# Patient Record
Sex: MALE | Race: WHITE | NOT HISPANIC OR LATINO | Employment: FULL TIME | URBAN - METROPOLITAN AREA
[De-identification: names, ages, dates, MRNs, and addresses within clinical notes are randomized per-mention and may not be internally consistent; named-entity substitution may affect disease eponyms.]

---

## 2022-10-09 ENCOUNTER — HOSPITAL ENCOUNTER (OUTPATIENT)
Facility: MEDICAL CENTER | Age: 22
End: 2022-10-10
Attending: EMERGENCY MEDICINE | Admitting: HOSPITALIST
Payer: COMMERCIAL

## 2022-10-09 ENCOUNTER — APPOINTMENT (OUTPATIENT)
Dept: RADIOLOGY | Facility: MEDICAL CENTER | Age: 22
End: 2022-10-09
Attending: EMERGENCY MEDICINE
Payer: COMMERCIAL

## 2022-10-09 DIAGNOSIS — R00.1 BRADYCARDIA: ICD-10-CM

## 2022-10-09 DIAGNOSIS — I47.10 SVT (SUPRAVENTRICULAR TACHYCARDIA) (HCC): ICD-10-CM

## 2022-10-09 PROBLEM — I10 PRIMARY HYPERTENSION: Status: ACTIVE | Noted: 2022-10-09

## 2022-10-09 PROBLEM — E78.5 DYSLIPIDEMIA: Status: ACTIVE | Noted: 2022-10-09

## 2022-10-09 PROBLEM — T50.905A BRADYCARDIA, DRUG INDUCED: Status: ACTIVE | Noted: 2022-10-09

## 2022-10-09 PROBLEM — F31.9 BIPOLAR 1 DISORDER (HCC): Status: ACTIVE | Noted: 2022-10-09

## 2022-10-09 PROBLEM — R55 SYNCOPE AND COLLAPSE: Status: ACTIVE | Noted: 2022-10-09

## 2022-10-09 PROBLEM — F17.200 NICOTINE DEPENDENCE: Status: ACTIVE | Noted: 2022-10-09

## 2022-10-09 LAB
ALBUMIN SERPL BCP-MCNC: 4.2 G/DL (ref 3.2–4.9)
ALBUMIN/GLOB SERPL: 1.4 G/DL
ALP SERPL-CCNC: 97 U/L (ref 30–99)
ALT SERPL-CCNC: 63 U/L (ref 2–50)
ANION GAP SERPL CALC-SCNC: 10 MMOL/L (ref 7–16)
AST SERPL-CCNC: 36 U/L (ref 12–45)
BASOPHILS # BLD AUTO: 0.4 % (ref 0–1.8)
BASOPHILS # BLD: 0.05 K/UL (ref 0–0.12)
BILIRUB SERPL-MCNC: 0.2 MG/DL (ref 0.1–1.5)
BUN SERPL-MCNC: 15 MG/DL (ref 8–22)
CALCIUM SERPL-MCNC: 9.7 MG/DL (ref 8.5–10.5)
CHLORIDE SERPL-SCNC: 106 MMOL/L (ref 96–112)
CO2 SERPL-SCNC: 23 MMOL/L (ref 20–33)
CREAT SERPL-MCNC: 0.84 MG/DL (ref 0.5–1.4)
EKG IMPRESSION: NORMAL
EKG IMPRESSION: NORMAL
EOSINOPHIL # BLD AUTO: 0.34 K/UL (ref 0–0.51)
EOSINOPHIL NFR BLD: 3 % (ref 0–6.9)
ERYTHROCYTE [DISTWIDTH] IN BLOOD BY AUTOMATED COUNT: 46.4 FL (ref 35.9–50)
GFR SERPLBLD CREATININE-BSD FMLA CKD-EPI: 125 ML/MIN/1.73 M 2
GLOBULIN SER CALC-MCNC: 2.9 G/DL (ref 1.9–3.5)
GLUCOSE SERPL-MCNC: 94 MG/DL (ref 65–99)
HCT VFR BLD AUTO: 43.3 % (ref 42–52)
HGB BLD-MCNC: 14.2 G/DL (ref 14–18)
IMM GRANULOCYTES # BLD AUTO: 0.06 K/UL (ref 0–0.11)
IMM GRANULOCYTES NFR BLD AUTO: 0.5 % (ref 0–0.9)
LYMPHOCYTES # BLD AUTO: 2.4 K/UL (ref 1–4.8)
LYMPHOCYTES NFR BLD: 21.1 % (ref 22–41)
MCH RBC QN AUTO: 28.9 PG (ref 27–33)
MCHC RBC AUTO-ENTMCNC: 32.8 G/DL (ref 33.7–35.3)
MCV RBC AUTO: 88 FL (ref 81.4–97.8)
MONOCYTES # BLD AUTO: 0.76 K/UL (ref 0–0.85)
MONOCYTES NFR BLD AUTO: 6.7 % (ref 0–13.4)
NEUTROPHILS # BLD AUTO: 7.79 K/UL (ref 1.82–7.42)
NEUTROPHILS NFR BLD: 68.3 % (ref 44–72)
NRBC # BLD AUTO: 0 K/UL
NRBC BLD-RTO: 0 /100 WBC
PLATELET # BLD AUTO: 292 K/UL (ref 164–446)
PMV BLD AUTO: 10.2 FL (ref 9–12.9)
POTASSIUM SERPL-SCNC: 4.2 MMOL/L (ref 3.6–5.5)
PROT SERPL-MCNC: 7.1 G/DL (ref 6–8.2)
RBC # BLD AUTO: 4.92 M/UL (ref 4.7–6.1)
SODIUM SERPL-SCNC: 139 MMOL/L (ref 135–145)
TROPONIN T SERPL-MCNC: <6 NG/L (ref 6–19)
TSH SERPL DL<=0.005 MIU/L-ACNC: 1.6 UIU/ML (ref 0.38–5.33)
WBC # BLD AUTO: 11.4 K/UL (ref 4.8–10.8)

## 2022-10-09 PROCEDURE — 84484 ASSAY OF TROPONIN QUANT: CPT

## 2022-10-09 PROCEDURE — 80053 COMPREHEN METABOLIC PANEL: CPT

## 2022-10-09 PROCEDURE — 700102 HCHG RX REV CODE 250 W/ 637 OVERRIDE(OP): Performed by: HOSPITALIST

## 2022-10-09 PROCEDURE — 99285 EMERGENCY DEPT VISIT HI MDM: CPT

## 2022-10-09 PROCEDURE — 93005 ELECTROCARDIOGRAM TRACING: CPT

## 2022-10-09 PROCEDURE — G0378 HOSPITAL OBSERVATION PER HR: HCPCS

## 2022-10-09 PROCEDURE — 84443 ASSAY THYROID STIM HORMONE: CPT

## 2022-10-09 PROCEDURE — 71045 X-RAY EXAM CHEST 1 VIEW: CPT

## 2022-10-09 PROCEDURE — 99220 PR INITIAL OBSERVATION CARE,LEVL III: CPT | Performed by: HOSPITALIST

## 2022-10-09 PROCEDURE — A9270 NON-COVERED ITEM OR SERVICE: HCPCS | Performed by: HOSPITALIST

## 2022-10-09 PROCEDURE — 93005 ELECTROCARDIOGRAM TRACING: CPT | Performed by: EMERGENCY MEDICINE

## 2022-10-09 PROCEDURE — 85025 COMPLETE CBC W/AUTO DIFF WBC: CPT

## 2022-10-09 RX ORDER — BENZTROPINE MESYLATE 0.5 MG/1
0.5 TABLET ORAL 2 TIMES DAILY
Status: DISCONTINUED | OUTPATIENT
Start: 2022-10-09 | End: 2022-10-10 | Stop reason: HOSPADM

## 2022-10-09 RX ORDER — FAMOTIDINE 20 MG/1
20 TABLET, FILM COATED ORAL DAILY
COMMUNITY

## 2022-10-09 RX ORDER — CLONAZEPAM 0.5 MG/1
0.5 TABLET ORAL NIGHTLY
Status: DISCONTINUED | OUTPATIENT
Start: 2022-10-09 | End: 2022-10-10 | Stop reason: HOSPADM

## 2022-10-09 RX ORDER — IBUPROFEN 600 MG/1
600 TABLET ORAL 3 TIMES DAILY PRN
Status: DISCONTINUED | OUTPATIENT
Start: 2022-10-09 | End: 2022-10-10 | Stop reason: HOSPADM

## 2022-10-09 RX ORDER — EZETIMIBE 10 MG/1
10 TABLET ORAL DAILY
COMMUNITY

## 2022-10-09 RX ORDER — BENZTROPINE MESYLATE 0.5 MG/1
0.5 TABLET ORAL 2 TIMES DAILY
COMMUNITY

## 2022-10-09 RX ORDER — QUETIAPINE FUMARATE 100 MG/1
100 TABLET, FILM COATED ORAL 2 TIMES DAILY
COMMUNITY

## 2022-10-09 RX ORDER — BISACODYL 10 MG
10 SUPPOSITORY, RECTAL RECTAL
Status: DISCONTINUED | OUTPATIENT
Start: 2022-10-09 | End: 2022-10-10 | Stop reason: HOSPADM

## 2022-10-09 RX ORDER — PRAZOSIN HYDROCHLORIDE 1 MG/1
2 CAPSULE ORAL NIGHTLY
COMMUNITY

## 2022-10-09 RX ORDER — HYDRALAZINE HYDROCHLORIDE 20 MG/ML
10 INJECTION INTRAMUSCULAR; INTRAVENOUS EVERY 6 HOURS PRN
Status: DISCONTINUED | OUTPATIENT
Start: 2022-10-09 | End: 2022-10-10 | Stop reason: HOSPADM

## 2022-10-09 RX ORDER — ONDANSETRON 4 MG/1
4 TABLET, ORALLY DISINTEGRATING ORAL EVERY 4 HOURS PRN
Status: DISCONTINUED | OUTPATIENT
Start: 2022-10-09 | End: 2022-10-10 | Stop reason: HOSPADM

## 2022-10-09 RX ORDER — NICOTINE 21 MG/24HR
1 PATCH, TRANSDERMAL 24 HOURS TRANSDERMAL EVERY 24 HOURS
COMMUNITY

## 2022-10-09 RX ORDER — METHOCARBAMOL 500 MG/1
1000 TABLET, FILM COATED ORAL 2 TIMES DAILY PRN
Status: DISCONTINUED | OUTPATIENT
Start: 2022-10-09 | End: 2022-10-10 | Stop reason: HOSPADM

## 2022-10-09 RX ORDER — PROPRANOLOL HYDROCHLORIDE 80 MG/1
80 CAPSULE, EXTENDED RELEASE ORAL DAILY
Status: ON HOLD | COMMUNITY
End: 2022-10-10

## 2022-10-09 RX ORDER — MIRTAZAPINE 15 MG/1
7.5 TABLET, FILM COATED ORAL NIGHTLY
Status: DISCONTINUED | OUTPATIENT
Start: 2022-10-09 | End: 2022-10-10 | Stop reason: HOSPADM

## 2022-10-09 RX ORDER — AMOXICILLIN 250 MG
2 CAPSULE ORAL 2 TIMES DAILY
Status: DISCONTINUED | OUTPATIENT
Start: 2022-10-09 | End: 2022-10-10 | Stop reason: HOSPADM

## 2022-10-09 RX ORDER — PROPRANOLOL HYDROCHLORIDE 80 MG/1
80 CAPSULE, EXTENDED RELEASE ORAL
Status: SHIPPED | COMMUNITY
End: 2022-10-09

## 2022-10-09 RX ORDER — CLONAZEPAM 0.5 MG/1
0.5 TABLET ORAL NIGHTLY
COMMUNITY

## 2022-10-09 RX ORDER — PROMETHAZINE HYDROCHLORIDE 25 MG/1
12.5-25 SUPPOSITORY RECTAL EVERY 4 HOURS PRN
Status: DISCONTINUED | OUTPATIENT
Start: 2022-10-09 | End: 2022-10-10 | Stop reason: HOSPADM

## 2022-10-09 RX ORDER — FAMOTIDINE 20 MG/1
20 TABLET, FILM COATED ORAL DAILY
Status: DISCONTINUED | OUTPATIENT
Start: 2022-10-10 | End: 2022-10-10 | Stop reason: HOSPADM

## 2022-10-09 RX ORDER — ACETAMINOPHEN 325 MG/1
650 TABLET ORAL EVERY 4 HOURS PRN
Status: DISCONTINUED | OUTPATIENT
Start: 2022-10-09 | End: 2022-10-10 | Stop reason: HOSPADM

## 2022-10-09 RX ORDER — M-VIT,TX,IRON,MINS/CALC/FOLIC 27MG-0.4MG
1 TABLET ORAL DAILY
COMMUNITY

## 2022-10-09 RX ORDER — EZETIMIBE 10 MG/1
10 TABLET ORAL DAILY
Status: DISCONTINUED | OUTPATIENT
Start: 2022-10-10 | End: 2022-10-10 | Stop reason: HOSPADM

## 2022-10-09 RX ORDER — PRAZOSIN HYDROCHLORIDE 1 MG/1
2 CAPSULE ORAL NIGHTLY
Status: DISCONTINUED | OUTPATIENT
Start: 2022-10-09 | End: 2022-10-10 | Stop reason: HOSPADM

## 2022-10-09 RX ORDER — HALOPERIDOL 5 MG/ML
1 INJECTION INTRAMUSCULAR EVERY 4 HOURS PRN
Status: DISCONTINUED | OUTPATIENT
Start: 2022-10-09 | End: 2022-10-10 | Stop reason: HOSPADM

## 2022-10-09 RX ORDER — METHOCARBAMOL 500 MG/1
1000 TABLET, FILM COATED ORAL 2 TIMES DAILY PRN
COMMUNITY

## 2022-10-09 RX ORDER — ONDANSETRON 2 MG/ML
4 INJECTION INTRAMUSCULAR; INTRAVENOUS EVERY 4 HOURS PRN
Status: DISCONTINUED | OUTPATIENT
Start: 2022-10-09 | End: 2022-10-10 | Stop reason: HOSPADM

## 2022-10-09 RX ORDER — PROMETHAZINE HYDROCHLORIDE 25 MG/1
12.5-25 TABLET ORAL EVERY 4 HOURS PRN
Status: DISCONTINUED | OUTPATIENT
Start: 2022-10-09 | End: 2022-10-10 | Stop reason: HOSPADM

## 2022-10-09 RX ORDER — PRAZOSIN HYDROCHLORIDE 2 MG/1
2 CAPSULE ORAL NIGHTLY
Status: SHIPPED | COMMUNITY
End: 2022-10-09

## 2022-10-09 RX ORDER — ENOXAPARIN SODIUM 100 MG/ML
40 INJECTION SUBCUTANEOUS DAILY
Status: DISCONTINUED | OUTPATIENT
Start: 2022-10-10 | End: 2022-10-10 | Stop reason: HOSPADM

## 2022-10-09 RX ORDER — ZIPRASIDONE HYDROCHLORIDE 80 MG/1
80 CAPSULE ORAL 2 TIMES DAILY
COMMUNITY

## 2022-10-09 RX ORDER — ACETAMINOPHEN 325 MG/1
650 TABLET ORAL EVERY 4 HOURS PRN
COMMUNITY

## 2022-10-09 RX ORDER — LITHIUM CARBONATE 450 MG
900 TABLET, EXTENDED RELEASE ORAL 2 TIMES DAILY
Status: DISCONTINUED | OUTPATIENT
Start: 2022-10-09 | End: 2022-10-10 | Stop reason: HOSPADM

## 2022-10-09 RX ORDER — QUETIAPINE FUMARATE 100 MG/1
100 TABLET, FILM COATED ORAL 2 TIMES DAILY
Status: DISCONTINUED | OUTPATIENT
Start: 2022-10-09 | End: 2022-10-10 | Stop reason: HOSPADM

## 2022-10-09 RX ORDER — LITHIUM CARBONATE 450 MG
900 TABLET, EXTENDED RELEASE ORAL 2 TIMES DAILY
COMMUNITY

## 2022-10-09 RX ORDER — PROCHLORPERAZINE EDISYLATE 5 MG/ML
5-10 INJECTION INTRAMUSCULAR; INTRAVENOUS EVERY 4 HOURS PRN
Status: DISCONTINUED | OUTPATIENT
Start: 2022-10-09 | End: 2022-10-10 | Stop reason: HOSPADM

## 2022-10-09 RX ORDER — POLYETHYLENE GLYCOL 3350 17 G/17G
1 POWDER, FOR SOLUTION ORAL
Status: DISCONTINUED | OUTPATIENT
Start: 2022-10-09 | End: 2022-10-10 | Stop reason: HOSPADM

## 2022-10-09 RX ORDER — HALOPERIDOL 5 MG/ML
1 INJECTION INTRAMUSCULAR EVERY 4 HOURS PRN
COMMUNITY

## 2022-10-09 RX ORDER — MIRTAZAPINE 7.5 MG/1
7.5 TABLET, FILM COATED ORAL NIGHTLY
COMMUNITY

## 2022-10-09 RX ORDER — ZIPRASIDONE HYDROCHLORIDE 40 MG/1
80 CAPSULE ORAL 2 TIMES DAILY
Status: DISCONTINUED | OUTPATIENT
Start: 2022-10-09 | End: 2022-10-10 | Stop reason: HOSPADM

## 2022-10-09 RX ORDER — OMEPRAZOLE 20 MG/1
20 CAPSULE, DELAYED RELEASE ORAL 2 TIMES DAILY
COMMUNITY

## 2022-10-09 RX ORDER — NICOTINE 21 MG/24HR
14 PATCH, TRANSDERMAL 24 HOURS TRANSDERMAL
Status: DISCONTINUED | OUTPATIENT
Start: 2022-10-10 | End: 2022-10-10 | Stop reason: HOSPADM

## 2022-10-09 RX ORDER — IBUPROFEN 400 MG/1
400 TABLET ORAL EVERY 6 HOURS PRN
COMMUNITY

## 2022-10-09 RX ORDER — PROPRANOLOL HYDROCHLORIDE 10 MG/1
20 TABLET ORAL 2 TIMES DAILY
Status: DISCONTINUED | OUTPATIENT
Start: 2022-10-09 | End: 2022-10-10 | Stop reason: HOSPADM

## 2022-10-09 RX ADMIN — PROPRANOLOL HYDROCHLORIDE 20 MG: 10 TABLET ORAL at 21:17

## 2022-10-09 RX ADMIN — PRAZOSIN HYDROCHLORIDE 2 MG: 1 CAPSULE ORAL at 20:59

## 2022-10-09 RX ADMIN — QUETIAPINE FUMARATE 100 MG: 100 TABLET ORAL at 20:57

## 2022-10-09 RX ADMIN — ZIPRASIDONE HYDROCHLORIDE 80 MG: 40 CAPSULE ORAL at 20:59

## 2022-10-09 RX ADMIN — LITHIUM CARBONATE 900 MG: 450 TABLET, EXTENDED RELEASE ORAL at 20:57

## 2022-10-09 RX ADMIN — METHOCARBAMOL 1000 MG: 500 TABLET ORAL at 22:44

## 2022-10-09 RX ADMIN — BENZTROPINE MESYLATE 0.5 MG: 0.5 TABLET ORAL at 20:58

## 2022-10-09 RX ADMIN — ACETAMINOPHEN 650 MG: 325 TABLET, FILM COATED ORAL at 21:18

## 2022-10-09 RX ADMIN — CLONAZEPAM 0.5 MG: 0.5 TABLET ORAL at 20:54

## 2022-10-09 RX ADMIN — MIRTAZAPINE 7.5 MG: 15 TABLET, FILM COATED ORAL at 20:55

## 2022-10-09 RX ADMIN — IBUPROFEN 600 MG: 600 TABLET ORAL at 21:18

## 2022-10-09 ASSESSMENT — LIFESTYLE VARIABLES
TOTAL SCORE: 0
HAVE YOU EVER FELT YOU SHOULD CUT DOWN ON YOUR DRINKING: NO
DOES PATIENT WANT TO STOP DRINKING: NO
AVERAGE NUMBER OF DAYS PER WEEK YOU HAVE A DRINK CONTAINING ALCOHOL: 0
TOTAL SCORE: 0
EVER FELT BAD OR GUILTY ABOUT YOUR DRINKING: NO
ON A TYPICAL DAY WHEN YOU DRINK ALCOHOL HOW MANY DRINKS DO YOU HAVE: 0
ALCOHOL_USE: NO
HAVE PEOPLE ANNOYED YOU BY CRITICIZING YOUR DRINKING: NO
HOW MANY TIMES IN THE PAST YEAR HAVE YOU HAD 5 OR MORE DRINKS IN A DAY: 0
CONSUMPTION TOTAL: NEGATIVE
EVER HAD A DRINK FIRST THING IN THE MORNING TO STEADY YOUR NERVES TO GET RID OF A HANGOVER: NO
TOTAL SCORE: 0
SUBSTANCE_ABUSE: 0

## 2022-10-09 ASSESSMENT — ENCOUNTER SYMPTOMS
DIZZINESS: 0
FEVER: 0
DEPRESSION: 0
HEMOPTYSIS: 0
CHILLS: 0
PALPITATIONS: 0
BACK PAIN: 0
TREMORS: 0
NAUSEA: 0
ABDOMINAL PAIN: 0
TINGLING: 0
SPUTUM PRODUCTION: 0
HEARTBURN: 0
NECK PAIN: 0
DOUBLE VISION: 0
WEIGHT LOSS: 0
PHOTOPHOBIA: 0
MYALGIAS: 0
HEADACHES: 0
BLURRED VISION: 0
COUGH: 0
VOMITING: 0
DIARRHEA: 0

## 2022-10-09 ASSESSMENT — PATIENT HEALTH QUESTIONNAIRE - PHQ9
SUM OF ALL RESPONSES TO PHQ9 QUESTIONS 1 AND 2: 6
4. FEELING TIRED OR HAVING LITTLE ENERGY: NEARLY EVERY DAY
SUM OF ALL RESPONSES TO PHQ QUESTIONS 1-9: 23
7. TROUBLE CONCENTRATING ON THINGS, SUCH AS READING THE NEWSPAPER OR WATCHING TELEVISION: NEARLY EVERY DAY
1. LITTLE INTEREST OR PLEASURE IN DOING THINGS: NEARLY EVERY DAY
9. THOUGHTS THAT YOU WOULD BE BETTER OFF DEAD, OR OF HURTING YOURSELF: SEVERAL DAYS
5. POOR APPETITE OR OVEREATING: NEARLY EVERY DAY
3. TROUBLE FALLING OR STAYING ASLEEP OR SLEEPING TOO MUCH: NEARLY EVERY DAY
6. FEELING BAD ABOUT YOURSELF - OR THAT YOU ARE A FAILURE OR HAVE LET YOURSELF OR YOUR FAMILY DOWN: NEARLY EVERY DAY
2. FEELING DOWN, DEPRESSED, IRRITABLE, OR HOPELESS: NEARLY EVERY DAY
8. MOVING OR SPEAKING SO SLOWLY THAT OTHER PEOPLE COULD HAVE NOTICED. OR THE OPPOSITE, BEING SO FIGETY OR RESTLESS THAT YOU HAVE BEEN MOVING AROUND A LOT MORE THAN USUAL: SEVERAL DAYS

## 2022-10-09 ASSESSMENT — PAIN DESCRIPTION - PAIN TYPE: TYPE: ACUTE PAIN

## 2022-10-09 NOTE — ED TRIAGE NOTES
BIB [facility] to rm 38 from [facility]  Chief Complaint   Patient presents with    Dizziness     Near syncope, HR at [facility] 38     Pt is sandi legal hold for SI, has been at [facility] for 21 day, denies feeling suicidal today, sitter outside the room.  Pt had a sudden onset of dizziness and bradycardia, had a near syncopal episode. Takes propranolol for tachycardia, last dose was last night. Current VSS, still feels slightly dizzy.

## 2022-10-09 NOTE — ED PROVIDER NOTES
ED Provider Note    CHIEF COMPLAINT  Chief Complaint   Patient presents with    Dizziness     [facility]       HPI  Jose Santos is a 22 y.o. male here for evaluation of bradycardia and dizziness.  Patient was at [facility], when he had a near syncopal episode, today, but did have a syncopal episode a couple weeks ago.  He states that he has no chest pain, and no shortness of breath.  However he states that he was trying to see his cardiologist in [place], but ended up seeing the nurse practitioner for the group instead.  He is supposed to have an echo done in the next 1 to 2 months, but was sent in here because of continued bradycardia and dizziness.  He has no abdominal pain, back pain, or chest pain.  He has no shortness of breath.      ROS  See HPI for further details, o/w negative.     PAST MEDICAL HISTORY   has a past medical history of Tachycardia.    SOCIAL HISTORY  Social History     Tobacco Use    Smoking status: Never    Smokeless tobacco: Never   Vaping Use    Vaping Use: Some days    Substances: Nicotine   Substance and Sexual Activity    Alcohol use: Never    Drug use: Never    Sexual activity: Not on file       Family History  No bleeding disorders    SURGICAL HISTORY  patient denies any surgical history    CURRENT MEDICATIONS  Home Medications       Reviewed by Patricia Malloy R.N. (Registered Nurse) on 10/09/22 at 1344  Med List Status: Complete     Medication Last Dose Status   prazosin (MINIPRESS) 2 MG Cap 10/8/2022 Active   propranolol CR (INDERAL LA) 80 MG CAPSULE SR 24 HR 10/8/2022 Active                    ALLERGIES  No Known Allergies    REVIEW OF SYSTEMS  See HPI for further details. Review of systems as above, otherwise all other systems are negative.     PHYSICAL EXAM  Constitutional: Well developed, well nourished. No acute distress.  HEENT: Normocephalic, atraumatic. Posterior pharynx clear and moist.  Eyes:  EOMI. Normal sclera.  Neck: Supple,  Full range of motion, nontender.  Chest/Pulmonary: clear to ausculation. Symmetrical expansion.   Cardio: Regular rate and rhythm with no murmur.   Abdomen: Soft, nontender. No peritoneal signs. No guarding. No palpable masses.  Musculoskeletal: No deformity, no edema, neurovascular intact.   Neuro: Clear speech, appropriate, cooperative, cranial nerves II-XII grossly intact.  Psych: Normal mood and affect      Results for orders placed or performed during the hospital encounter of 10/09/22   CBC w/ Differential   Result Value Ref Range    WBC 11.4 (H) 4.8 - 10.8 K/uL    RBC 4.92 4.70 - 6.10 M/uL    Hemoglobin 14.2 14.0 - 18.0 g/dL    Hematocrit 43.3 42.0 - 52.0 %    MCV 88.0 81.4 - 97.8 fL    MCH 28.9 27.0 - 33.0 pg    MCHC 32.8 (L) 33.7 - 35.3 g/dL    RDW 46.4 35.9 - 50.0 fL    Platelet Count 292 164 - 446 K/uL    MPV 10.2 9.0 - 12.9 fL    Neutrophils-Polys 68.30 44.00 - 72.00 %    Lymphocytes 21.10 (L) 22.00 - 41.00 %    Monocytes 6.70 0.00 - 13.40 %    Eosinophils 3.00 0.00 - 6.90 %    Basophils 0.40 0.00 - 1.80 %    Immature Granulocytes 0.50 0.00 - 0.90 %    Nucleated RBC 0.00 /100 WBC    Neutrophils (Absolute) 7.79 (H) 1.82 - 7.42 K/uL    Lymphs (Absolute) 2.40 1.00 - 4.80 K/uL    Monos (Absolute) 0.76 0.00 - 0.85 K/uL    Eos (Absolute) 0.34 0.00 - 0.51 K/uL    Baso (Absolute) 0.05 0.00 - 0.12 K/uL    Immature Granulocytes (abs) 0.06 0.00 - 0.11 K/uL    NRBC (Absolute) 0.00 K/uL   Comp Metabolic Panel   Result Value Ref Range    Sodium 139 135 - 145 mmol/L    Potassium 4.2 3.6 - 5.5 mmol/L    Chloride 106 96 - 112 mmol/L    Co2 23 20 - 33 mmol/L    Anion Gap 10.0 7.0 - 16.0    Glucose 94 65 - 99 mg/dL    Bun 15 8 - 22 mg/dL    Creatinine 0.84 0.50 - 1.40 mg/dL    Calcium 9.7 8.5 - 10.5 mg/dL    AST(SGOT) 36 12 - 45 U/L    ALT(SGPT) 63 (H) 2 - 50 U/L    Alkaline Phosphatase 97 30 - 99 U/L    Total Bilirubin 0.2 0.1 - 1.5 mg/dL    Albumin 4.2 3.2 - 4.9 g/dL    Total Protein 7.1 6.0 - 8.2 g/dL    Globulin 2.9  1.9 - 3.5 g/dL    A-G Ratio 1.4 g/dL   TROPONIN   Result Value Ref Range    Troponin T <6 6 - 19 ng/L   ESTIMATED GFR   Result Value Ref Range    GFR (CKD-EPI) 125 >60 mL/min/1.73 m 2   TSH   Result Value Ref Range    TSH 1.600 0.380 - 5.330 uIU/mL   EKG (NOW)   Result Value Ref Range    Report       [facility] Emergency Dept.    Test Date:  2022-10-09  Pt Name:    SHANNON MARMOLEJO                 Department: ER  MRN:        [id number]                      Room:       [facility]  Gender:     Male                         Technician: [id number]  :        2000                   Requested By:ER TRIAGE PROTOCOL  Order #:    [id number]                    Reading MD:    Measurements  Intervals                                Axis  Rate:       67                           P:          44  VT:         199                          QRS:        74  QRSD:       103                          T:          15  QT:         402  QTc:        425    Interpretive Statements  Sinus rhythm  No previous ECG available for comparison       DX-CHEST-LIMITED (1 VIEW)   Final Result         Ill-defined opacity in the left lung base could relate to mild infection.        Ekg;  nsr 67. No st elevation, no st depression qtc 425.      PROCEDURES     MEDICAL RECORD  I have reviewed patient's medical record and pertinent results are listed.    COURSE & MEDICAL DECISION MAKING  I have reviewed any medical record information, laboratory studies and radiographic results as noted above.    5:45 PM  Dr. Smith on for cardiology, states it is reasonable to admit the pt and obtain an echo.      The pt will be admitted to the hospitalist service.     HYDRATION: Based on the patient's presentation of Dehydration the patient was given IV fluids. IV Hydration was used because oral hydration was not adequate alone. Upon recheck following hydration, the patient was improved.      FINAL IMPRESSION  Bradycardia   Near syncope    Electronically signed by: Antonino  JAUN Calderon D.O., 10/9/2022 4:24 PM

## 2022-10-10 ENCOUNTER — APPOINTMENT (OUTPATIENT)
Dept: CARDIOLOGY | Facility: MEDICAL CENTER | Age: 22
End: 2022-10-10
Attending: HOSPITALIST
Payer: COMMERCIAL

## 2022-10-10 VITALS
WEIGHT: 240 LBS | TEMPERATURE: 97.1 F | RESPIRATION RATE: 16 BRPM | BODY MASS INDEX: 33.6 KG/M2 | HEIGHT: 71 IN | HEART RATE: 72 BPM | SYSTOLIC BLOOD PRESSURE: 128 MMHG | OXYGEN SATURATION: 95 % | DIASTOLIC BLOOD PRESSURE: 70 MMHG

## 2022-10-10 PROBLEM — R55 SYNCOPE AND COLLAPSE: Status: RESOLVED | Noted: 2022-10-09 | Resolved: 2022-10-10

## 2022-10-10 PROBLEM — T50.905A BRADYCARDIA, DRUG INDUCED: Status: RESOLVED | Noted: 2022-10-09 | Resolved: 2022-10-10

## 2022-10-10 PROBLEM — R00.1 BRADYCARDIA, DRUG INDUCED: Status: RESOLVED | Noted: 2022-10-09 | Resolved: 2022-10-10

## 2022-10-10 LAB
ANION GAP SERPL CALC-SCNC: 11 MMOL/L (ref 7–16)
BUN SERPL-MCNC: 18 MG/DL (ref 8–22)
CALCIUM SERPL-MCNC: 9.7 MG/DL (ref 8.5–10.5)
CHLORIDE SERPL-SCNC: 105 MMOL/L (ref 96–112)
CO2 SERPL-SCNC: 22 MMOL/L (ref 20–33)
CREAT SERPL-MCNC: 1.1 MG/DL (ref 0.5–1.4)
GFR SERPLBLD CREATININE-BSD FMLA CKD-EPI: 96 ML/MIN/1.73 M 2
GLUCOSE SERPL-MCNC: 90 MG/DL (ref 65–99)
LITHIUM SERPL-MCNC: 1.4 MMOL/L (ref 0.6–1.2)
LV EJECT FRACT  99904: 75
LV EJECT FRACT MOD 2C 99903: 71.76
LV EJECT FRACT MOD 4C 99902: 65.7
LV EJECT FRACT MOD BP 99901: 69.82
MAGNESIUM SERPL-MCNC: 1.9 MG/DL (ref 1.5–2.5)
POTASSIUM SERPL-SCNC: 4.1 MMOL/L (ref 3.6–5.5)
SODIUM SERPL-SCNC: 138 MMOL/L (ref 135–145)

## 2022-10-10 PROCEDURE — 80048 BASIC METABOLIC PNL TOTAL CA: CPT

## 2022-10-10 PROCEDURE — 700102 HCHG RX REV CODE 250 W/ 637 OVERRIDE(OP): Performed by: HOSPITALIST

## 2022-10-10 PROCEDURE — 99217 PR OBSERVATION CARE DISCHARGE: CPT | Performed by: NURSE PRACTITIONER

## 2022-10-10 PROCEDURE — A9270 NON-COVERED ITEM OR SERVICE: HCPCS | Performed by: HOSPITALIST

## 2022-10-10 PROCEDURE — 80178 ASSAY OF LITHIUM: CPT

## 2022-10-10 PROCEDURE — 83735 ASSAY OF MAGNESIUM: CPT

## 2022-10-10 PROCEDURE — 93306 TTE W/DOPPLER COMPLETE: CPT | Mod: 26 | Performed by: INTERNAL MEDICINE

## 2022-10-10 PROCEDURE — 93306 TTE W/DOPPLER COMPLETE: CPT

## 2022-10-10 PROCEDURE — G0378 HOSPITAL OBSERVATION PER HR: HCPCS

## 2022-10-10 RX ORDER — PROPRANOLOL HYDROCHLORIDE 20 MG/1
20 TABLET ORAL 2 TIMES DAILY
Qty: 90 TABLET | Refills: 11 | Status: SHIPPED
Start: 2022-10-10

## 2022-10-10 RX ADMIN — FAMOTIDINE 20 MG: 20 TABLET, FILM COATED ORAL at 06:27

## 2022-10-10 RX ADMIN — NICOTINE 14 MG: 14 PATCH TRANSDERMAL at 06:26

## 2022-10-10 RX ADMIN — ZIPRASIDONE HYDROCHLORIDE 80 MG: 40 CAPSULE ORAL at 06:27

## 2022-10-10 RX ADMIN — ACETAMINOPHEN 650 MG: 325 TABLET, FILM COATED ORAL at 09:51

## 2022-10-10 RX ADMIN — EZETIMIBE 10 MG: 10 TABLET ORAL at 06:27

## 2022-10-10 RX ADMIN — QUETIAPINE FUMARATE 100 MG: 100 TABLET ORAL at 06:27

## 2022-10-10 RX ADMIN — PROPRANOLOL HYDROCHLORIDE 20 MG: 10 TABLET ORAL at 06:27

## 2022-10-10 RX ADMIN — IBUPROFEN 600 MG: 600 TABLET ORAL at 09:51

## 2022-10-10 RX ADMIN — LITHIUM CARBONATE 900 MG: 450 TABLET, EXTENDED RELEASE ORAL at 06:27

## 2022-10-10 RX ADMIN — BENZTROPINE MESYLATE 0.5 MG: 0.5 TABLET ORAL at 06:27

## 2022-10-10 NOTE — DISCHARGE SUMMARY
Discharge Summary    CHIEF COMPLAINT ON ADMISSION  Chief Complaint   Patient presents with    Dizziness     Near syncope, HR at [facility] 38       Reason for Admission  Low Heart Rate     Admission Date  10/9/2022    CODE STATUS  Full Code    HPI & HOSPITAL COURSE    Jose Santos is a 22 y.o. male who presented 10/9/2022 with past medical history of bipolar type I, hypertension, obesity, palpitations, GERD who was transferred here from a mental health facility after having a near syncopal episode.  The patient apparently had another near syncopal episode earlier this week.  Patient said he feels dizzy and feels like his got a pass out but is no loss of consciousness.  Patient denies any chest pain or shortness of breath.  He was seen at [facility] emergency department and apparently his heart rate was 38.  Patient was transferred to [facility] for further care and management.     Cardiology Dr. Smith was consulted in the emergency department, she believes that syncope and bradycardia are secondary to patient's high-dose extended release propanolol, she recommended switching patient to 20 mg twice daily propanolol immediate release.  She has been stable on telemetry with no events, denies any dizziness or syncope.  Transthoracic echocardiogram was completed which shows hyperdynamic ventricle 75%, no valvulopathy and no additional issues.    Recommend that once patient is discharged he seek follow-up with cardiology for management of his tachycardia and possible outpatient event monitoring.    Patient was medically cleared and transferred back to [facility]    I have performed a physical exam and reviewed and updated ROS and Plan today (10/10/2022). In review of yesterday's note (10/9/2022), there are no changes except as documented above.      Therefore, he is discharged in fair and stable condition to a psychiatric hospital.    The patient recovered much more quickly than anticipated on  admission.    Discharge Date  10/10/2022    FOLLOW UP ITEMS POST DISCHARGE  Decrease propanolol, stop taking extended release propanolol 80 mg in the morning and changed to immediate release propanolol 20 mg twice a day.    Once discharged to set up appointment with cardiology for ongoing management of ventricular tachycardia and possible event monitor.    DISCHARGE DIAGNOSES  Principal Problem (Resolved):    Syncope and collapse POA: Yes  Active Problems:    history of SVT (supraventricular tachycardia) ([facility]) POA: Yes    Primary hypertension POA: Yes    Bipolar 1 disorder (HCC) POA: Yes    Nicotine dependence POA: Yes    Dyslipidemia POA: Yes  Resolved Problems:    Bradycardia, drug induced POA: Yes      FOLLOW UP  No future appointments.  No follow-up provider specified.    MEDICATIONS ON DISCHARGE     Medication List        ASK your doctor about these medications        Instructions   acetaminophen 325 MG Tabs  Commonly known as: Tylenol   Take 650 mg by mouth every four hours as needed.  Dose: 650 mg     benzocaine 20 % Gel topical gel  Commonly known as: ANBESOL/ORAJEL   Use 1 Application in the mouth or throat every 6 hours as needed.  Dose: 1 Application     benztropine 0.5 MG Tabs  Commonly known as: COGENTIN   Take 0.5 mg by mouth 2 times a day.  Dose: 0.5 mg     clonazePAM 0.5 MG Tabs  Commonly known as: KLONOPIN   Take 0.5 mg by mouth every evening.  Dose: 0.5 mg     ezetimibe 10 MG Tabs  Commonly known as: ZETIA   Take 10 mg by mouth every day.  Dose: 10 mg     famotidine 20 MG Tabs  Commonly known as: PEPCID   Take 20 mg by mouth every day.  Dose: 20 mg     haloperidol lactate 5 MG/ML Soln  Commonly known as: HALDOL   Inject 1 mg into the shoulder, thigh, or buttocks every four hours as needed.  Dose: 1 mg     ibuprofen 400 MG Tabs  Commonly known as: MOTRIN   Take 400 mg by mouth every 6 hours as needed.  Dose: 400 mg     lithium carbonate  MG Tbcr tablet   Take 900 mg by mouth 2 times a day. 2  tablets = 900 mg, twice daily  Dose: 900 mg     methocarbamol 500 MG Tabs  Commonly known as: ROBAXIN   Take 1,000 mg by mouth 2 times a day as needed.  Dose: 1,000 mg     mirtazapine 7.5 MG tablet  Commonly known as: Remeron   Take 7.5 mg by mouth every evening.  Dose: 7.5 mg     nicotine 21 MG/24HR Pt24  Commonly known as: NICODERM   Place 1 Patch on the skin every 24 hours.  Dose: 1 Patch     OMEGA-3-6-9 PO   Take 1 Patch by mouth every day.  Dose: 1 Patch     omeprazole 20 MG delayed-release capsule  Commonly known as: PRILOSEC   Take 20 mg by mouth 2 times a day.  Dose: 20 mg     prazosin 1 MG Caps  Commonly known as: MINIPRESS  Ask about: Which instructions should I use?   Take 2 mg by mouth every evening.  Dose: 2 mg     propranolol CR 80 MG Cp24  Commonly known as: INDERAL LA  Ask about: Which instructions should I use?   Take 80 mg by mouth every day.  Dose: 80 mg     QUEtiapine 100 MG Tabs  Commonly known as: Seroquel   Take 100 mg by mouth 2 times a day.  Dose: 100 mg     therapeutic multivitamin-minerals Tabs   Take 1 Tablet by mouth every day.  Dose: 1 Tablet     ziprasidone 80 MG Caps  Commonly known as: GEODON   Take 80 mg by mouth 2 times a day.  Dose: 80 mg              Allergies  No Known Allergies    DIET  Orders Placed This Encounter   Procedures    Diet Order Diet: Cardiac     Standing Status:   Standing     Number of Occurrences:   1     Order Specific Question:   Diet:     Answer:   Cardiac [6]       ACTIVITY  As tolerated.  Weight bearing as tolerated    CONSULTATIONS  Cardiology    PROCEDURES  Transthoracic echocardiogram    LABORATORY  Lab Results   Component Value Date    SODIUM 138 10/10/2022    POTASSIUM 4.1 10/10/2022    CHLORIDE 105 10/10/2022    CO2 22 10/10/2022    GLUCOSE 90 10/10/2022    BUN 18 10/10/2022    CREATININE 1.10 10/10/2022        Lab Results   Component Value Date    WBC 11.4 (H) 10/09/2022    HEMOGLOBIN 14.2 10/09/2022    HEMATOCRIT 43.3 10/09/2022    PLATELETCT 292  10/09/2022      EC-ECHOCARDIOGRAM COMPLETE W/O CONT   Final Result      DX-CHEST-LIMITED (1 VIEW)   Final Result         Ill-defined opacity in the left lung base could relate to mild infection.           Total time of the discharge process took 36 minutes.

## 2022-10-10 NOTE — DISCHARGE PLANNING
DC Transport Scheduled    Received request at: 10/10/2022 1351    Transport Company Scheduled:  [facility]    Scheduled Date: 10/10/2022  Scheduled Time: 1730    Destination: [facility]  [address] [place] [address]    Notified care team of scheduled transport via Voalte.     If there are any changes needed to the DC transportation scheduled, please contact [facility] at ext. [phone] between the hours of 4258-2878 Mon-Fri. If outside those hours, contact the ED Case Manager at ext. [phone].

## 2022-10-10 NOTE — PROGRESS NOTES
Patient transported to unit via wheelchair with this RN and tele monitor in place.  Tele monitor showing NSR.    Patient AAO x 4, pleasant, no SI at this time, on RA, no c/o pain nor discomfort.    Sitter followed with to patient's new room.    Report given to night shift RNGalen.

## 2022-10-10 NOTE — PROGRESS NOTES
Report called to Prakash at [facility].    Prakash made aware [employer] transport to pickup patient at 1730 this evening.    Prakash requesting a copy of labs, CXR, EKG, and echo.

## 2022-10-10 NOTE — ASSESSMENT & PLAN NOTE
This is most likely related to bradycardia caused by Inderal    Inderal has been decreased to 20 minutes p.o. twice daily  Continue to monitor on telemetry    Check TSH    Check echo

## 2022-10-10 NOTE — DISCHARGE PLANNING
Alert Team Note:    Contacted by [facility], spoke to Chau. Facility is willing to re-accept pt once medically clear.

## 2022-10-10 NOTE — DISCHARGE PLANNING
Case Management Discharge Planning    Admission Date: 10/9/2022  GMLOS:    ALOS: 0    6-Clicks ADL Score:    6-Clicks Mobility Score:        Anticipated Discharge Dispo: Discharge Disposition: D/T to psych hosp or distinct part unit (65)  Discharge Address: [address] [place] [address], [place] [address]    DME Needed: No    Action(s) Taken: Spoke with [facility] nursing supervisor [facility]. Agreeable to receiving pt back today.     Roderick contacted, transport setup for 1730 via T. Pt agreeable. Completed COBRA packet given to bedside RN Luma, reminded her of legal hold packet to go with pt.     Escalations Completed: None    Medically Clear: No    Next Steps: Follow for emerging dcp needs.    Barriers to Discharge: Medical clearance

## 2022-10-10 NOTE — PROGRESS NOTES
I was called to discuss this patients care with Dr. Calderon. We discussed bradycardia.    Briefly, this is a 22 year old man with history of psychiatry disorder, anxiety, and tachycardia on propranolol who presented with episodes of syncope for the last week. It is unclear what kind of tachycardia he has.   He was supposed to get an echocardiogram, but unable to schedule anytime soon.  He had an episode of syncope when he was at [facility] facility with possible bradycardia.  EKG here shows normal sinus rhythm.  Initial troponin negative. It is reasonable to obtain echocardiogram in this patient and observe overnight for any bradycardia. Recommend decreasing propranolol to 20mg twice daily and observe for any break through tachycardia.   Consider event monitor outpatient.     At this time it was deemed no formal in person cardiology consultation was necessary, however if this changes due to changes in patient condition or abnormal test results, please re-consult cardiology.    Electronically Signed by:    Dede Smith M.D.     10/9/2022    5:50 PM

## 2022-10-10 NOTE — HOSPITAL COURSE
8/11/2017          To Whom It May Concern:    Lexus Diggs had a PPD (tuberculin skin test) placed on the right forearm on 8/9/11. The results were read on 8/11/17 by Keely Dozier RN    RESULTS:  0 mm induration noted    Sincerely,        Keely Dozier RN  [facility]  [address] [facility] [address]  [phone]             Jose Santos is a 22 y.o. male who presented 10/9/2022 with past medical history of bipolar type I, hypertension, obesity, palpitations, GERD who was transferred here from a mental health facility after having a near syncopal episode.  The patient apparently had another near syncopal episode earlier this week.  Patient said he feels dizzy and feels like his got a pass out but is no loss of consciousness.  Patient denies any chest pain or shortness of breath.  He was seen at [facility] emergency department and apparently his heart rate was 38.  Patient was transferred to [facility] for further care and management.     Cardiology Dr. Smith was consulted in the emergency department, she believes that syncope and bradycardia are secondary to patient's high-dose extended release propanolol, she recommended switching patient to 20 mg twice daily propanolol immediate release.  She has been stable on telemetry with no events, denies any dizziness or syncope.  Transthoracic echocardiogram was completed which shows hyperdynamic ventricle 75%, no valvulopathy and no additional issues.    Recommend that once patient is discharged he seek follow-up with cardiology for management of his tachycardia and possible outpatient event monitoring.    Patient was medically cleared and transferred back to [facility]

## 2022-10-10 NOTE — CARE PLAN
Problem: Knowledge Deficit - Standard  Goal: Patient and family/care givers will demonstrate understanding of plan of care, disease process/condition, diagnostic tests and medications  Outcome: Progressing     Problem: Provide Safe Environment  Goal: Suicide environmental safety, protocols, policies, and practices will be implemented  Outcome: Progressing     Problem: Psychosocial  Goal: Patient's ability to identify and develop effective coping behaviors will improve  Outcome: Progressing  Goal: Patient's ability to identify and utilize available support systems will improve  Outcome: Progressing     Problem: Depression  Goal: Patient and family/caregiver will verbalize accurate information about at least two of the possible causes of depression, three-four of the signs and symptoms of depression  Outcome: Progressing     The patient is Stable - Low risk of patient condition declining or worsening         Progress made toward(s) clinical / shift goals:  Patient understands the importance of voicing his feelings and needs. Patient understands the importance of expressing any new feelings of depression or wanting to do self harm. Patient understands the importance of informing the care team about any new changes or feelings that might lead up to another incident of syncope.     Patient is not progressing towards the following goals:

## 2022-10-10 NOTE — H&P
Hospital Medicine History & Physical Note    Date of Service  10/9/2022    Primary Care Physician  No primary care provider on file.    Consultants  Sarah munoz signed off    Code Status  No Order    Chief Complaint  Chief Complaint   Patient presents with    Dizziness     [facility]       History of Presenting Illness  Jose Santos is a 22 y.o. male who presented 10/9/2022 with past medical history of bipolar type I, hypertension, obesity, palpitations,, GERD who was transferred here from a mental health facility after having a near syncopal episode.  The patient apparently had another near syncopal episode earlier this week.  Patient said he feels dizzy and feels like his got a pass out but is no loss of consciousness.  Patient denies any chest pain or shortness of breath.  He was seen at [facility] emergency department and apparently his heart rate was 38.  Patient was transferred to [facility] for further care and management    Review of the old chart shows the patient is on propanolol that he is taking for clinical hypertension and fast heart rate in the past    Dr. Ireland of cardiology reviewed the case and recommended reducing patient's and Inderal dose    I discussed the plan of care with patient.    Review of Systems  Review of Systems   Constitutional:  Negative for chills, fever, malaise/fatigue and weight loss.   HENT:  Negative for congestion, ear discharge, ear pain, hearing loss, nosebleeds and tinnitus.    Eyes:  Negative for blurred vision, double vision and photophobia.   Respiratory:  Negative for cough, hemoptysis and sputum production.    Cardiovascular:  Negative for chest pain and palpitations.   Gastrointestinal:  Negative for abdominal pain, diarrhea, heartburn, nausea and vomiting.   Genitourinary:  Negative for dysuria, frequency and urgency.   Musculoskeletal:  Negative for back pain, myalgias and neck pain.   Skin:  Negative for itching and rash.    Neurological:  Negative for dizziness, tingling, tremors and headaches.   Psychiatric/Behavioral:  Negative for depression, substance abuse and suicidal ideas.      Past Medical History   has a past medical history of Tachycardia.    Bipolar  Obesity  GERD  Dyslipidemia    Surgical History    Appendectomy   Family History    Family history reviewed with patient. There is no family history that is pertinent to the chief complaint.     Social History   reports that he has never smoked. He has never used smokeless tobacco. He reports that he does not drink alcohol and does not use drugs.    Allergies  No Known Allergies    Medications  Prior to Admission Medications   Prescriptions Last Dose Informant Patient Reported? Taking?   Omega 3-6-9 Fatty Acids (OMEGA-3-6-9 PO) 10/9/2022 at AM MAR from Other Facility Yes Yes   Sig: Take 1 Patch by mouth every day.   QUEtiapine (SEROQUEL) 100 MG Tab 10/9/2022 at AM MAR from Other Facility Yes Yes   Sig: Take 100 mg by mouth 2 times a day.   acetaminophen (TYLENOL) 325 MG Tab 10/9/2022 at AM MAR from Other Facility Yes Yes   Sig: Take 650 mg by mouth every four hours as needed.   benzocaine (ANBESOL/ORAJEL) 20 % Gel topical gel 10/7/2022 at PM MAR from Other Facility Yes Yes   Sig: Use 1 Application in the mouth or throat every 6 hours as needed.   benztropine (COGENTIN) 0.5 MG Tab 10/9/2022  Yes Yes   Sig: Take 0.5 mg by mouth 2 times a day.   clonazePAM (KLONOPIN) 0.5 MG Tab 10/8/2022 at PM MAR from Other Facility Yes Yes   Sig: Take 0.5 mg by mouth every evening.   ezetimibe (ZETIA) 10 MG Tab 10/9/2022 at AM MAR from Other Facility Yes Yes   Sig: Take 10 mg by mouth every day.   famotidine (PEPCID) 20 MG Tab 10/9/2022 at AM MAR from Other Facility Yes Yes   Sig: Take 20 mg by mouth every day.   haloperidol lactate (HALDOL) 5 MG/ML Solution 10/7/2022 at PRN MAR from Other Facility Yes Yes   Sig: Inject 1 mg into the shoulder, thigh, or buttocks every four hours as needed.    ibuprofen (MOTRIN) 400 MG Tab 10/9/2022 at AM MAR from Other Facility Yes Yes   Sig: Take 400 mg by mouth every 6 hours as needed.   lithium carbonate  MG Tab CR tablet 10/9/2022 at AM MAR from Other Facility Yes Yes   Sig: Take 900 mg by mouth 2 times a day. 2 tablets = 900 mg, twice daily   methocarbamol (ROBAXIN) 500 MG Tab 10/7/2022 at PRN MAR from Other Facility Yes Yes   Sig: Take 1,000 mg by mouth 2 times a day as needed.   mirtazapine (REMERON) 7.5 MG tablet 10/8/2022 at PM MAR from Other Facility Yes Yes   Sig: Take 7.5 mg by mouth every evening.   nicotine (NICODERM) 21 MG/24HR PATCH 24 HR 10/9/2022 at AM MAR from Other Facility Yes Yes   Sig: Place 1 Patch on the skin every 24 hours.   omeprazole (PRILOSEC) 20 MG delayed-release capsule 10/9/2022 at AM MAR from Other Facility Yes Yes   Sig: Take 20 mg by mouth 2 times a day.   prazosin (MINIPRESS) 1 MG Cap 10/8/2022 at PM MAR from Other Facility Yes Yes   Sig: Take 2 mg by mouth every evening.   propranolol CR (INDERAL LA) 80 MG CAPSULE SR 24 HR 10/8/2022 at PM MAR from Other Facility Yes Yes   Sig: Take 80 mg by mouth every day.   therapeutic multivitamin-minerals (THERAGRAN-M) Tab 10/9/2022 at AM MAR from Other Facility Yes Yes   Sig: Take 1 Tablet by mouth every day.   ziprasidone (GEODON) 80 MG Cap 10/9/2022 at AM MAR from Other Facility Yes Yes   Sig: Take 80 mg by mouth 2 times a day.      Facility-Administered Medications: None       Physical Exam  Temp:  [36.8 °C (98.3 °F)] 36.8 °C (98.3 °F)  Pulse:  [68-72] 71  Resp:  [15-19] 19  BP: (129-146)/(61-76) 146/70  SpO2:  [95 %-96 %] 95 %  Blood Pressure: (!) 146/70   Temperature: 36.8 °C (98.3 °F)   Pulse: 71   Respiration: 19   Pulse Oximetry: 95 %       Physical Exam  Constitutional:       General: He is not in acute distress.     Appearance: He is obese. He is not ill-appearing or toxic-appearing.   HENT:      Head: Normocephalic.   Eyes:      General:         Left eye: No discharge.       Extraocular Movements: Extraocular movements intact.      Pupils: Pupils are equal, round, and reactive to light.   Cardiovascular:      Rate and Rhythm: Normal rate and regular rhythm.      Heart sounds: No murmur heard.    No gallop.   Pulmonary:      Effort: Pulmonary effort is normal. No respiratory distress.      Breath sounds: No rales.   Abdominal:      General: There is no distension.      Palpations: There is no mass.      Tenderness: There is no abdominal tenderness. There is no right CVA tenderness, guarding or rebound.      Hernia: No hernia is present.   Musculoskeletal:         General: No swelling or deformity. Normal range of motion.      Cervical back: Normal range of motion.      Right lower leg: No edema.   Skin:     General: Skin is warm.      Capillary Refill: Capillary refill takes 2 to 3 seconds.      Coloration: Skin is not jaundiced.      Findings: No bruising or lesion.   Neurological:      General: No focal deficit present.      Mental Status: He is oriented to person, place, and time.      Cranial Nerves: No cranial nerve deficit.      Motor: No weakness.      Gait: Gait normal.   Psychiatric:         Mood and Affect: Mood normal.         Behavior: Behavior normal.     Laboratory:  Recent Labs     10/09/22  1435   WBC 11.4*   RBC 4.92   HEMOGLOBIN 14.2   HEMATOCRIT 43.3   MCV 88.0   MCH 28.9   MCHC 32.8*   RDW 46.4   PLATELETCT 292   MPV 10.2     Recent Labs     10/09/22  1522   SODIUM 139   POTASSIUM 4.2   CHLORIDE 106   CO2 23   GLUCOSE 94   BUN 15   CREATININE 0.84   CALCIUM 9.7     Recent Labs     10/09/22  1522   ALTSGPT 63*   ASTSGOT 36   ALKPHOSPHAT 97   TBILIRUBIN 0.2   GLUCOSE 94         No results for input(s): NTPROBNP in the last 72 hours.      Recent Labs     10/09/22  1522   TROPONINT <6       Imaging:  DX-CHEST-LIMITED (1 VIEW)   Final Result         Ill-defined opacity in the left lung base could relate to mild infection.      EC-ECHOCARDIOGRAM COMPLETE W/O CONT     (Results Pending)       EKG reviewed by me normal sinus rhythm rate of 67 no ST or T wave changes consistent with ischemia    Assessment/Plan:  Justification for Admission Status  I anticipate this patient is appropriate for observation status at this time because I expect the patient will be here less than 48 hours for further evaluation of this near-syncopal episode        * Syncope and collapse- (present on admission)  Assessment & Plan  This is most likely related to bradycardia caused by Inderal    Inderal has been decreased to 20 minutes p.o. twice daily  Continue to monitor on telemetry    Check TSH    Check echo      Dyslipidemia- (present on admission)  Assessment & Plan  Continue Zetia    Nicotine dependence- (present on admission)  Assessment & Plan  Vaping cessation advised    Nicotine patch ordered    Bipolar 1 disorder (HCC)- (present on admission)  Assessment & Plan  Continue patient's lithium    Check a lithium level    Bradycardia, drug induced- (present on admission)  Assessment & Plan   suspected due to the Inderal     Inderal dose has been decreased to 20 mg twice daily    Primary hypertension- (present on admission)  Assessment & Plan  On monitor BP adjust antihypertensive medications accordingly    history of SVT (supraventricular tachycardia) ([facility])- (present on admission)  Assessment & Plan  History of    We have  decreased patient's Inderal dosage    Monitor heart rate on telemetry      VTE prophylaxis: SCDs/TEDs

## 2022-10-10 NOTE — PROGRESS NOTES
Attempted to call report to [facility]...staff  currently rounding...will call back later.

## 2022-10-10 NOTE — PROGRESS NOTES
Assumed care from Luma ORTIZ.  Assessment complete. Plan of care gone over with patient and all concerns. Patient was resting in bed comfortably. Patient was A&O x 4. Safety precautions in place and sitter at bedside. Patient had concerns about medications but I explained we were waiting for pharmacy to approve his meds. Patient educated on how to use the call light.

## 2022-10-10 NOTE — CARE PLAN
"Assumed care of patient at shift change.  Patient AAO x 4, pleasant, on RA, c/o moderate pain in tooth...given PRN Tylenol and PRN Ibuprofen per request.  No SI this morning. Patient reports last SI was yesterday, 10/09/2022, morning...reported thoughts yesterday morning were \"fleeting thoughts\" and patient had no means nor plan of harming himself nor others.  Fresh water given to patient.  Report given to desean Mcbride. Rae relieved for 15 minute break at this time.  No further needs verbalized at this time.  Call bell within reach of sitter.  Will continue to monitor.          The patient is Stable - Low risk of patient condition declining or worsening    Shift Goals  Clinical Goals: Tele monitor, monitor labs, echo  Patient Goals: Comfort  Family Goals: N/A    Progress made toward(s) clinical / shift goals:    Problem: Knowledge Deficit - Standard  Goal: Patient and family/care givers will demonstrate understanding of plan of care, disease process/condition, diagnostic tests and medications  Outcome: Progressing     Problem: Provide Safe Environment  Goal: Suicide environmental safety, protocols, policies, and practices will be implemented  Outcome: Progressing     Problem: Psychosocial  Goal: Patient's ability to identify and develop effective coping behaviors will improve  Outcome: Progressing  Goal: Patient's ability to identify and utilize available support systems will improve  Outcome: Progressing     Problem: Depression  Goal: Patient and family/caregiver will verbalize accurate information about at least two of the possible causes of depression, three-four of the signs and symptoms of depression  Outcome: Progressing       Patient is not progressing towards the following goals:      "

## 2022-10-11 NOTE — PROGRESS NOTES
Patient discharged to [facility] with [facility].    AVS, discharge instructions, and education reviewed with patient. All questions answered.    Report given to Prakash at [facility]. All questions answered.    IV removed.    AVS, discharge instructions, belongings, and legal paperwork sent with patient.    Patient transported via gurney with medical transport staff x 2.

## 2022-10-18 ENCOUNTER — TELEPHONE (OUTPATIENT)
Dept: HEALTH INFORMATION MANAGEMENT | Facility: OTHER | Age: 22
End: 2022-10-18
Payer: COMMERCIAL